# Patient Record
Sex: FEMALE | Race: WHITE | NOT HISPANIC OR LATINO
[De-identification: names, ages, dates, MRNs, and addresses within clinical notes are randomized per-mention and may not be internally consistent; named-entity substitution may affect disease eponyms.]

---

## 2017-11-09 ENCOUNTER — APPOINTMENT (OUTPATIENT)
Dept: GYNECOLOGIC ONCOLOGY | Facility: CLINIC | Age: 67
End: 2017-11-09
Payer: MEDICARE

## 2017-11-09 PROCEDURE — 99397 PER PM REEVAL EST PAT 65+ YR: CPT

## 2019-04-03 ENCOUNTER — APPOINTMENT (OUTPATIENT)
Dept: ORTHOPEDIC SURGERY | Facility: CLINIC | Age: 69
End: 2019-04-03
Payer: MEDICARE

## 2019-04-03 VITALS — RESPIRATION RATE: 16 BRPM | BODY MASS INDEX: 17.75 KG/M2 | HEIGHT: 61 IN | WEIGHT: 94 LBS

## 2019-04-03 DIAGNOSIS — M65.9 SYNOVITIS AND TENOSYNOVITIS, UNSPECIFIED: ICD-10-CM

## 2019-04-03 DIAGNOSIS — M65.312 TRIGGER THUMB, LEFT THUMB: ICD-10-CM

## 2019-04-03 PROCEDURE — 20550 NJX 1 TENDON SHEATH/LIGAMENT: CPT | Mod: F6

## 2019-04-03 PROCEDURE — 99203 OFFICE O/P NEW LOW 30 MIN: CPT | Mod: 25

## 2019-04-03 PROCEDURE — 76882 US LMTD JT/FCL EVL NVASC XTR: CPT | Mod: RT

## 2019-04-04 ENCOUNTER — TRANSCRIPTION ENCOUNTER (OUTPATIENT)
Age: 69
End: 2019-04-04

## 2019-04-15 ENCOUNTER — MOBILE ON CALL (OUTPATIENT)
Age: 69
End: 2019-04-15

## 2019-06-28 ENCOUNTER — APPOINTMENT (OUTPATIENT)
Dept: ORTHOPEDIC SURGERY | Facility: CLINIC | Age: 69
End: 2019-06-28

## 2019-12-03 NOTE — PHYSICAL EXAM
[Abnormal] : Examination of vagina: Abnormal [Atrophy] : atrophy [Absent] : Adnexa(ae): Absent [Normal] : Recto-Vaginal Exam: Normal

## 2019-12-06 ENCOUNTER — APPOINTMENT (OUTPATIENT)
Dept: GYNECOLOGIC ONCOLOGY | Facility: CLINIC | Age: 69
End: 2019-12-06
Payer: MEDICARE

## 2019-12-06 VITALS
BODY MASS INDEX: 17.37 KG/M2 | OXYGEN SATURATION: 96 % | HEART RATE: 77 BPM | WEIGHT: 92 LBS | DIASTOLIC BLOOD PRESSURE: 74 MMHG | HEIGHT: 61 IN | SYSTOLIC BLOOD PRESSURE: 115 MMHG

## 2019-12-06 DIAGNOSIS — N95.2 POSTMENOPAUSAL ATROPHIC VAGINITIS: ICD-10-CM

## 2019-12-06 DIAGNOSIS — Z00.00 ENCOUNTER FOR GENERAL ADULT MEDICAL EXAMINATION W/OUT ABNORMAL FINDINGS: ICD-10-CM

## 2019-12-06 PROCEDURE — 99397 PER PM REEVAL EST PAT 65+ YR: CPT

## 2019-12-06 NOTE — DISCUSSION/SUMMARY
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 If bleeding persists for more than 2 weeks, will order CTAP\par Continue dilator use\par Mammogram\par Follow up in 1 year

## 2019-12-06 NOTE — HISTORY OF PRESENT ILLNESS
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 Areas of mixed mesodermal tumor\par 2) EBRT 41.4Gy completed 9/20015, HDR completed 11/2005\par 4) Vaginal biopsy 8/2014 squamous mucosa\par \par Here for annual surveillance visit.  Did not use estrogen vaginal cream prescribed at last visit, too concerned about hormone exposure. Did find an OTC moisturizer which has been working well. She was having bleeding about 1 spot a month, after dilating. For the past 3 weeks has seen a spot every day. \par \par health Maintenance\par pap 8/2014 neg\par Bone Density 9/2016 osteoporosis, managed by Dr. Mckenna Levi\par Mammogram 2014\par

## 2019-12-06 NOTE — REVIEW OF SYSTEMS
[Negative] : Musculoskeletal [Abn Vag Bleeding] : abnormal vaginal bleeding [de-identified] : GERD, abdominal pain from fundoplication

## 2021-10-15 ENCOUNTER — NON-APPOINTMENT (OUTPATIENT)
Age: 71
End: 2021-10-15

## 2021-10-15 ENCOUNTER — APPOINTMENT (OUTPATIENT)
Dept: GYNECOLOGIC ONCOLOGY | Facility: CLINIC | Age: 71
End: 2021-10-15
Payer: MEDICARE

## 2021-10-15 VITALS
TEMPERATURE: 97.2 F | WEIGHT: 87 LBS | HEART RATE: 85 BPM | OXYGEN SATURATION: 99 % | HEIGHT: 61 IN | DIASTOLIC BLOOD PRESSURE: 74 MMHG | BODY MASS INDEX: 16.42 KG/M2 | RESPIRATION RATE: 18 BRPM | SYSTOLIC BLOOD PRESSURE: 124 MMHG

## 2021-10-15 PROCEDURE — 99397 PER PM REEVAL EST PAT 65+ YR: CPT

## 2021-10-15 NOTE — REVIEW OF SYSTEMS
[Negative] : Musculoskeletal [Abn Vag Bleeding] : abnormal vaginal bleeding [de-identified] : GERD, abdominal pain from fundoplication

## 2021-10-15 NOTE — HISTORY OF PRESENT ILLNESS
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 Areas of mixed mesodermal tumor\par 2) EBRT 41.4Gy completed 9/20015, HDR completed 11/2005\par 4) Vaginal biopsy 8/2014 squamous mucosa\par \par Here for annual surveillance visit. Using OTC vaginal moisturizer - spaced dilating to every 10 days and bleeding has stopped. a few months ago had some bleeding with constipation. Has to take daily iron and has been very constipated. Now has a bothersome hemorrhoid too. \par \par \par health Maintenance\par Bone Density 2021 osteoporosis, managed by Dr. Mckenna Levi\par Mammogram 5/2018\par

## 2021-10-15 NOTE — PHYSICAL EXAM
[Abnormal] : Examination of vagina: Abnormal [Atrophy] : atrophy [Absent] : Adnexa(ae): Absent [Normal] : Recto-Vaginal Exam: Normal [de-identified] : external hemorrhoid

## 2021-10-15 NOTE — DISCUSSION/SUMMARY
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

## 2022-01-03 DIAGNOSIS — K62.3 RECTAL PROLAPSE: ICD-10-CM

## 2023-02-14 ENCOUNTER — APPOINTMENT (OUTPATIENT)
Dept: GYNECOLOGIC ONCOLOGY | Facility: CLINIC | Age: 73
End: 2023-02-14
Payer: MEDICARE

## 2023-02-14 VITALS
HEART RATE: 76 BPM | WEIGHT: 85 LBS | HEIGHT: 61 IN | BODY MASS INDEX: 16.05 KG/M2 | OXYGEN SATURATION: 96 % | SYSTOLIC BLOOD PRESSURE: 113 MMHG | DIASTOLIC BLOOD PRESSURE: 69 MMHG | TEMPERATURE: 98 F

## 2023-02-14 PROCEDURE — 99397 PER PM REEVAL EST PAT 65+ YR: CPT | Mod: GY

## 2023-02-14 PROCEDURE — G0101: CPT

## 2023-02-14 RX ORDER — PRASTERONE 6.5 MG/1
6.5 INSERT VAGINAL
Qty: 1 | Refills: 3 | Status: COMPLETED | COMMUNITY
Start: 2019-12-06 | End: 2023-02-14

## 2023-02-14 NOTE — REVIEW OF SYSTEMS
[Negative] : Musculoskeletal [Abn Vag Bleeding] : abnormal vaginal bleeding [de-identified] : GERD, abdominal pain from fundoplication

## 2023-02-14 NOTE — DISCUSSION/SUMMARY
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

## 2023-02-14 NOTE — HISTORY OF PRESENT ILLNESS
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 Areas of mixed mesodermal tumor\par 2) EBRT 41.4Gy completed 9/20015, HDR completed 11/2005\par 4) Vaginal biopsy 8/2014 squamous mucosa\par \par Here for annual surveillance visit.  Had rectoplexy in June 2022. Symptoms seem to be returning now and she is also having urinary leakage. Patient is not aware of the incontinence but gets damp and has to use a poise pad for it. has to change the pad twice a day. Still using dilator every 10 days without issue. \par \par health Maintenance\par Bone Density 2021 osteoporosis, managed by Dr. Mckenna Levi\par Mammogram 10/2021

## 2023-02-14 NOTE — PHYSICAL EXAM
[Chaperone Present] : A chaperone was present in the examining room during all aspects of the physical examination [Abnormal] : Examination of vagina: Abnormal [Atrophy] : atrophy [Absent] : Adnexa(ae): Absent [Normal] : Recto-Vaginal Exam: Normal [Restricted in physically strenuous activity but ambulatory and able to carry out work of a light or sedentary nature] : Status 1- Restricted in physically strenuous activity but ambulatory and able to carry out work of a light or sedentary nature, e.g., light house work, office work [de-identified] : external hemorrhoid

## 2023-11-13 ENCOUNTER — NON-APPOINTMENT (OUTPATIENT)
Age: 73
End: 2023-11-13

## 2023-11-13 ENCOUNTER — APPOINTMENT (OUTPATIENT)
Dept: OPHTHALMOLOGY | Facility: CLINIC | Age: 73
End: 2023-11-13
Payer: MEDICARE

## 2023-11-13 PROCEDURE — 68761 CLOSE TEAR DUCT OPENING: CPT | Mod: E3

## 2023-11-13 PROCEDURE — 92004 COMPRE OPH EXAM NEW PT 1/>: CPT

## 2023-12-04 ENCOUNTER — APPOINTMENT (OUTPATIENT)
Dept: OPHTHALMOLOGY | Facility: CLINIC | Age: 73
End: 2023-12-04
Payer: MEDICARE

## 2023-12-04 ENCOUNTER — NON-APPOINTMENT (OUTPATIENT)
Age: 73
End: 2023-12-04

## 2023-12-04 PROCEDURE — 92012 INTRM OPH EXAM EST PATIENT: CPT
